# Patient Record
Sex: FEMALE | Race: OTHER | Employment: UNEMPLOYED | URBAN - METROPOLITAN AREA
[De-identification: names, ages, dates, MRNs, and addresses within clinical notes are randomized per-mention and may not be internally consistent; named-entity substitution may affect disease eponyms.]

---

## 2020-11-23 PROBLEM — K62.5 RECTAL BLEEDING: Status: ACTIVE | Noted: 2020-10-30

## 2021-10-07 ENCOUNTER — APPOINTMENT (OUTPATIENT)
Dept: GENERAL RADIOLOGY | Age: 61
End: 2021-10-07
Attending: NURSE PRACTITIONER
Payer: OTHER GOVERNMENT

## 2021-10-07 ENCOUNTER — HOSPITAL ENCOUNTER (EMERGENCY)
Age: 61
Discharge: HOME | End: 2021-10-07
Attending: EMERGENCY MEDICINE
Payer: OTHER GOVERNMENT

## 2021-10-07 VITALS
WEIGHT: 170 LBS | DIASTOLIC BLOOD PRESSURE: 73 MMHG | RESPIRATION RATE: 20 BRPM | SYSTOLIC BLOOD PRESSURE: 125 MMHG | TEMPERATURE: 97.9 F | HEART RATE: 72 BPM | OXYGEN SATURATION: 100 % | BODY MASS INDEX: 29.02 KG/M2 | HEIGHT: 64 IN

## 2021-10-07 DIAGNOSIS — R07.9 CHEST PAIN, UNSPECIFIED TYPE: Primary | ICD-10-CM

## 2021-10-07 LAB
ALBUMIN SERPL-MCNC: 4.1 G/DL (ref 3.4–5)
ALBUMIN/GLOB SERPL: 1.2 {RATIO} (ref 0.8–1.7)
ALP SERPL-CCNC: 74 U/L (ref 45–117)
ALT SERPL-CCNC: 37 U/L (ref 13–56)
ANION GAP SERPL CALC-SCNC: 6 MMOL/L (ref 3–18)
AST SERPL-CCNC: 27 U/L (ref 10–38)
ATRIAL RATE: 71 BPM
BASOPHILS # BLD: 0 K/UL (ref 0–0.1)
BASOPHILS NFR BLD: 1 % (ref 0–2)
BILIRUB SERPL-MCNC: 0.6 MG/DL (ref 0.2–1)
BUN SERPL-MCNC: 13 MG/DL (ref 7–18)
BUN/CREAT SERPL: 18 (ref 12–20)
CALCIUM SERPL-MCNC: 9.5 MG/DL (ref 8.5–10.1)
CALCULATED P AXIS, ECG09: 52 DEGREES
CALCULATED R AXIS, ECG10: 14 DEGREES
CALCULATED T AXIS, ECG11: 44 DEGREES
CHLORIDE SERPL-SCNC: 108 MMOL/L (ref 100–111)
CK MB CFR SERPL CALC: NORMAL % (ref 0–4)
CK MB SERPL-MCNC: <1 NG/ML (ref 5–25)
CK SERPL-CCNC: 150 U/L (ref 26–192)
CO2 SERPL-SCNC: 28 MMOL/L (ref 21–32)
CREAT SERPL-MCNC: 0.74 MG/DL (ref 0.6–1.3)
DIAGNOSIS, 93000: NORMAL
DIFFERENTIAL METHOD BLD: NORMAL
EOSINOPHIL # BLD: 0.1 K/UL (ref 0–0.4)
EOSINOPHIL NFR BLD: 2 % (ref 0–5)
ERYTHROCYTE [DISTWIDTH] IN BLOOD BY AUTOMATED COUNT: 12.6 % (ref 11.6–14.5)
GLOBULIN SER CALC-MCNC: 3.3 G/DL (ref 2–4)
GLUCOSE SERPL-MCNC: 110 MG/DL (ref 74–99)
HCT VFR BLD AUTO: 41 % (ref 35–45)
HGB BLD-MCNC: 13.3 G/DL (ref 12–16)
LIPASE SERPL-CCNC: 153 U/L (ref 73–393)
LYMPHOCYTES # BLD: 1.8 K/UL (ref 0.9–3.6)
LYMPHOCYTES NFR BLD: 39 % (ref 21–52)
MCH RBC QN AUTO: 28.4 PG (ref 24–34)
MCHC RBC AUTO-ENTMCNC: 32.4 G/DL (ref 31–37)
MCV RBC AUTO: 87.4 FL (ref 78–100)
MONOCYTES # BLD: 0.3 K/UL (ref 0.05–1.2)
MONOCYTES NFR BLD: 6 % (ref 3–10)
NEUTS SEG # BLD: 2.4 K/UL (ref 1.8–8)
NEUTS SEG NFR BLD: 52 % (ref 40–73)
P-R INTERVAL, ECG05: 168 MS
PLATELET # BLD AUTO: 251 K/UL (ref 135–420)
PMV BLD AUTO: 10 FL (ref 9.2–11.8)
POTASSIUM SERPL-SCNC: 4.3 MMOL/L (ref 3.5–5.5)
PROT SERPL-MCNC: 7.4 G/DL (ref 6.4–8.2)
Q-T INTERVAL, ECG07: 392 MS
QRS DURATION, ECG06: 78 MS
QTC CALCULATION (BEZET), ECG08: 425 MS
RBC # BLD AUTO: 4.69 M/UL (ref 4.2–5.3)
SODIUM SERPL-SCNC: 142 MMOL/L (ref 136–145)
TROPONIN I SERPL-MCNC: <0.02 NG/ML (ref 0–0.04)
VENTRICULAR RATE, ECG03: 71 BPM
WBC # BLD AUTO: 4.6 K/UL (ref 4.6–13.2)

## 2021-10-07 PROCEDURE — 83690 ASSAY OF LIPASE: CPT

## 2021-10-07 PROCEDURE — 71046 X-RAY EXAM CHEST 2 VIEWS: CPT

## 2021-10-07 PROCEDURE — 85025 COMPLETE CBC W/AUTO DIFF WBC: CPT

## 2021-10-07 PROCEDURE — 82553 CREATINE MB FRACTION: CPT

## 2021-10-07 PROCEDURE — 93005 ELECTROCARDIOGRAM TRACING: CPT

## 2021-10-07 PROCEDURE — 99283 EMERGENCY DEPT VISIT LOW MDM: CPT

## 2021-10-07 PROCEDURE — 80053 COMPREHEN METABOLIC PANEL: CPT

## 2021-10-07 NOTE — ED NOTES
I performed a brief evaluation, including history and physical, of the patient here in triage and I have determined that the patient will need further treatment and evaluation from the main side ER provider. I have placed initial orders to help in expediting patients care.      October 07, 2021 at 11:49 AM - SHERI SmithP        Visit Vitals  /73 (BP 1 Location: Left upper arm, BP Patient Position: At rest)   Pulse 72   Temp 97.9 °F (36.6 °C)   Resp 20   SpO2 100%

## 2021-10-07 NOTE — ED PROVIDER NOTES
EMERGENCY DEPARTMENT HISTORY AND PHYSICAL EXAM    12:54 PM patient seen at this time and results waiting      Date: 10/7/2021  Patient Name: Farshad Flores    History of Presenting Illness     Chief Complaint   Patient presents with    Chest Pain         History Provided By: patient    Additional History (Context): Farshad Flores is a 57 y.o. female presents with *yesterday began with right-sided chest pain, some right upper quadrant pain and severe nausea. It was so severe she had to sit up at night and \"just prayed. \". Pain largely resolved this morning just some vague right-sided chest discomfort not really pain at this point but was still quite scared by the incident. History of diabetes hypercholesterolemia cholecystectomy and recurrent pancreatitis. PCP: Ruby Campos MD    Chief Complaint:   Duration:    Timing:    Location:   Quality:   Severity:   Modifying Factors:   Associated Symptoms:       Current Outpatient Medications   Medication Sig Dispense Refill    naproxen (NAPROSYN) 500 mg tablet naproxen 500 mg tablet      triamcinolone acetonide (KENALOG) 0.1 % topical cream triamcinolone acetonide 0.1 % topical cream      trospium (SANCTURA) 20 mg tablet Take 1 Tab by mouth two (2) times a day. 180 Tab 3    estradioL (ESTRACE) 0.01 % (0.1 mg/gram) vaginal cream INSERT 1 GRAM BY VAGINAL ROUTE 3 TIMES PER WEEK FOR 90 DAYS      ciprofloxacin HCl (CIPRO PO) Take  by mouth.  garlic cap Take 1 Cap by mouth daily.  CAPSICUM, CAYENNE, PO Take 1 Cap by mouth daily.  GINGER OIL PO Take 1 Tab by mouth.  OTHER Take 1 Tab by mouth daily as needed. Indications: sea buck thorn, aloe vera, lipid acid, licorice, bitter melon, dandelion      APPLE CIDER VINEGAR PO Take 1 Tab by mouth daily as needed.          Past History     Past Medical History:  Past Medical History:   Diagnosis Date    Diabetes ([facility] [address]     Hyperlipidemia     Pancreatitis due to common bile duct stone        Past Surgical History:  Past Surgical History:   Procedure Laterality Date    HX  SECTION      HX CHOLECYSTECTOMY  2009    HX OVARIAN CYST REMOVAL         Family History:  Family History   Problem Relation Age of Onset    Diabetes Mother     Hypertension Father     Kidney Disease Father        Social History:  Social History     Tobacco Use    Smoking status: Never Smoker    Smokeless tobacco: Never Used   Substance Use Topics    Alcohol use: No    Drug use: No       Allergies: Allergies   Allergen Reactions    Neomycin-Polymyxin-Hc Swelling         Review of Systems     Review of Systems   Constitutional: Negative for diaphoresis and fever. HENT: Negative for congestion and sore throat. Eyes: Negative for pain and itching. Respiratory: Negative for cough and shortness of breath. Cardiovascular: Positive for chest pain. Negative for palpitations. Gastrointestinal: Positive for abdominal pain and nausea. Negative for diarrhea. Endocrine: Negative for polydipsia and polyuria. Genitourinary: Negative for dysuria and hematuria. Musculoskeletal: Negative for arthralgias and myalgias. Skin: Negative for rash and wound. Neurological: Negative for seizures and syncope. Hematological: Does not bruise/bleed easily. Psychiatric/Behavioral: Negative for agitation and hallucinations. Physical Exam       Patient Vitals for the past 12 hrs:   Temp Pulse Resp BP SpO2   10/07/21 1116 97.9 °F (36.6 °C) 72 20 125/73 100 %       IPVITALS  Patient Vitals for the past 24 hrs:   BP Temp Pulse Resp SpO2 Height Weight   10/07/21 1157      5' 4\" (1.626 m) 77.1 kg (170 lb)   10/07/21 1116 125/73 97.9 °F (36.6 °C) 72 20 100 %         Physical Exam  Vitals and nursing note reviewed. Constitutional:       General: She is not in acute distress. Appearance: She is well-developed. She is obese. She is not ill-appearing. HENT:      Head: Normocephalic and atraumatic.    Eyes: General: No scleral icterus. Conjunctiva/sclera: Conjunctivae normal.   Neck:      Vascular: No JVD. Cardiovascular:      Rate and Rhythm: Normal rate and regular rhythm. Heart sounds: Normal heart sounds. Comments: 4 intact extremity pulses  Pulmonary:      Effort: Pulmonary effort is normal.      Breath sounds: Normal breath sounds. Chest:      Chest wall: No tenderness. Abdominal:      Palpations: Abdomen is soft. There is no mass. Tenderness: There is no abdominal tenderness. Musculoskeletal:         General: Normal range of motion. Cervical back: Normal range of motion and neck supple. Lymphadenopathy:      Cervical: No cervical adenopathy. Skin:     General: Skin is warm and dry. Neurological:      Mental Status: She is alert. Diagnostic Study Results   Labs -  Recent Results (from the past 12 hour(s))   CBC WITH AUTOMATED DIFF    Collection Time: 10/07/21 11:52 AM   Result Value Ref Range    WBC 4.6 4.6 - 13.2 K/uL    RBC 4.69 4.20 - 5.30 M/uL    HGB 13.3 12.0 - 16.0 g/dL    HCT 41.0 35.0 - 45.0 %    MCV 87.4 78.0 - 100.0 FL    MCH 28.4 24.0 - 34.0 PG    MCHC 32.4 31.0 - 37.0 g/dL    RDW 12.6 11.6 - 14.5 %    PLATELET 769 092 - 120 K/uL    MPV 10.0 9.2 - 11.8 FL    NEUTROPHILS 52 40 - 73 %    LYMPHOCYTES 39 21 - 52 %    MONOCYTES 6 3 - 10 %    EOSINOPHILS 2 0 - 5 %    BASOPHILS 1 0 - 2 %    ABS. NEUTROPHILS 2.4 1.8 - 8.0 K/UL    ABS. LYMPHOCYTES 1.8 0.9 - 3.6 K/UL    ABS. MONOCYTES 0.3 0.05 - 1.2 K/UL    ABS. EOSINOPHILS 0.1 0.0 - 0.4 K/UL    ABS.  BASOPHILS 0.0 0.0 - 0.1 K/UL    DF AUTOMATED     METABOLIC PANEL, COMPREHENSIVE    Collection Time: 10/07/21 11:52 AM   Result Value Ref Range    Sodium 142 136 - 145 mmol/L    Potassium 4.3 3.5 - 5.5 mmol/L    Chloride 108 100 - 111 mmol/L    CO2 28 21 - 32 mmol/L    Anion gap 6 3.0 - 18 mmol/L    Glucose 110 (H) 74 - 99 mg/dL    BUN 13 7.0 - 18 MG/DL    Creatinine 0.74 0.6 - 1.3 MG/DL    BUN/Creatinine ratio 18 12 - 20      GFR est AA >60 >60 ml/min/1.73m2    GFR est non-AA >60 >60 ml/min/1.73m2    Calcium 9.5 8.5 - 10.1 MG/DL    Bilirubin, total 0.6 0.2 - 1.0 MG/DL    ALT (SGPT) 37 13 - 56 U/L    AST (SGOT) 27 10 - 38 U/L    Alk. phosphatase 74 45 - 117 U/L    Protein, total 7.4 6.4 - 8.2 g/dL    Albumin 4.1 3.4 - 5.0 g/dL    Globulin 3.3 2.0 - 4.0 g/dL    A-G Ratio 1.2 0.8 - 1.7     CARDIAC PANEL,(CK, CKMB & TROPONIN)    Collection Time: 10/07/21 11:52 AM   Result Value Ref Range    CK - MB <1.0 <3.6 ng/ml    CK-MB Index  0.0 - 4.0 %     CALCULATION NOT PERFORMED WHEN RESULT IS BELOW LINEAR LIMIT     26 - 192 U/L    Troponin-I, QT <0.02 0.0 - 0.045 NG/ML   LIPASE    Collection Time: 10/07/21 11:52 AM   Result Value Ref Range    Lipase 153 73 - 393 U/L       Radiologic Studies -   XR CHEST PA LAT   Final Result   No acute findings. XR CHEST PA LAT    Result Date: 10/7/2021  EXAM: XR CHEST PA LAT INDICATION: CP COMPARISON: None. FINDINGS: PA and lateral radiographs of the chest demonstrate clear lungs. The cardiac and mediastinal contours and pulmonary vascularity are normal. Trace scoliosis. Cholecystectomy. .     No acute findings. Medications ordered:   Medications - No data to display      Medical Decision Making   Initial Medical Decision Making and DDx:  Chest x-ray no acute process    Twelve-lead EKG sinus rhythm at 71 no acute ischemic disease    Consider ACS has risk factors of age high cholesterol and diabetes. Unlikely to be a severe episode of pancreatitis as her pain has resolved  Considered pneumonia pneumothorax    ED Course: Progress Notes, Reevaluation, and Consults:     2:36 PM Pt reevaluated at this time. Discussed results and findings, as well as, diagnosis and plan for discharge. Follow up with doctors/services listed. Return to the emergency department for alarming symptoms. Pt verbalizes understanding and agreement with plan. All questions addressed.     No alarming findings no signs of pancreatitis pneumothorax pneumonia ACS. Patient still pain-free. Suitable for discharge. I am the first provider for this patient. I reviewed the vital signs, available nursing notes, past medical history, past surgical history, family history and social history. Patient Vitals for the past 12 hrs:   Temp Pulse Resp BP SpO2   10/07/21 1116 97.9 °F (36.6 °C) 72 20 125/73 100 %       Vital Signs-Reviewed the patient's vital signs. Pulse Oximetry Analysis, Cardiac Monitor, 12 lead ekg:      Interpreted by the EP. Records Reviewed: Nursing notes reviewed (Time of Review: 12:54 PM)    Procedures:   Critical Care Time:   Aspirin: (was aspirin given for stroke?)    Diagnosis     Clinical Impression:   1. Chest pain, unspecified type        Disposition: Discharged      Follow-up Information     Follow up With Specialties Details Why Contact Info    [address] [facility]    [address]  [phone]           Patient's Medications   Start Taking    No medications on file   Continue Taking    APPLE CIDER VINEGAR PO    Take 1 Tab by mouth daily as needed. CAPSICUM, CAYENNE, PO    Take 1 Cap by mouth daily. CIPROFLOXACIN HCL (CIPRO PO)    Take  by mouth. ESTRADIOL (ESTRACE) 0.01 % (0.1 MG/GRAM) VAGINAL CREAM    INSERT 1 GRAM BY VAGINAL ROUTE 3 TIMES PER WEEK FOR 90 DAYS    GARLIC CAP    Take 1 Cap by mouth daily. GINGER OIL PO    Take 1 Tab by mouth. NAPROXEN (NAPROSYN) 500 MG TABLET    naproxen 500 mg tablet    OTHER    Take 1 Tab by mouth daily as needed. Indications: sea buck thorn, aloe vera, lipid acid, licorice, bitter melon, dandelion    TRIAMCINOLONE ACETONIDE (KENALOG) 0.1 % TOPICAL CREAM    triamcinolone acetonide 0.1 % topical cream    TROSPIUM (SANCTURA) 20 MG TABLET    Take 1 Tab by mouth two (2) times a day.    These Medications have changed    No medications on file   Stop Taking    No medications on file _______________________________    Notes:    [facility], MD using Dragon dictation      _______________________________

## 2021-10-07 NOTE — MED STUDENT NOTES
EMERGENCY DEPARTMENT HISTORY AND PHYSICAL EXAM    1:23 PM    Date: 10/7/2021  Patient Name: Faviola Ramirez    History of Presenting Illness     Chief Complaint   Patient presents with    Chest Pain       History Provided By: Patient  Location/Duration/Severity/Modifying factors   Servando Chilel is a 62yo female with a history of DM, HLD, and pancreatitis with a CBD stone s/p cholecystectomy. The history is provided by the patient. Chest Pain (Angina)   This is a new problem. The current episode started 12 to 24 hours ago. The problem has been resolved. The problem occurs constantly. The pain is present in the right side. The pain is at a severity of 7/10. The pain is moderate. Quality: pulling sensation. The pain radiates to the mid back. Exacerbated by: laying on her back. Pertinent negatives include no abdominal pain, no cough, no dizziness, no fever, no nausea, no shortness of breath and no vomiting. She has tried nothing (Relieved by leaning forward) for the symptoms. Risk factors include diabetes mellitus and dyslipidemia. Her past medical history is significant for DM. PCP: Ruby Campos MD    Current Outpatient Medications   Medication Sig Dispense Refill    naproxen (NAPROSYN) 500 mg tablet naproxen 500 mg tablet      triamcinolone acetonide (KENALOG) 0.1 % topical cream triamcinolone acetonide 0.1 % topical cream      trospium (SANCTURA) 20 mg tablet Take 1 Tab by mouth two (2) times a day. 180 Tab 3    estradioL (ESTRACE) 0.01 % (0.1 mg/gram) vaginal cream INSERT 1 GRAM BY VAGINAL ROUTE 3 TIMES PER WEEK FOR 90 DAYS      ciprofloxacin HCl (CIPRO PO) Take  by mouth.  garlic cap Take 1 Cap by mouth daily.  CAPSICUM, CAYENNE, PO Take 1 Cap by mouth daily.  GINGER OIL PO Take 1 Tab by mouth.  OTHER Take 1 Tab by mouth daily as needed.  Indications: sea buck thorn, aloe vera, lipid acid, licorice, bitter melon, dandelion      APPLE CIDER VINEGAR PO Take 1 Tab by mouth daily as needed. Past History     Past Medical History:  Past Medical History:   Diagnosis Date    Diabetes ([address]     Hyperlipidemia     Pancreatitis due to common bile duct stone        Past Surgical History:  Past Surgical History:   Procedure Laterality Date    HX  SECTION      HX CHOLECYSTECTOMY  2009    HX OVARIAN CYST REMOVAL         Family History:  Family History   Problem Relation Age of Onset    Diabetes Mother     Hypertension Father     Kidney Disease Father        Social History:  Social History     Tobacco Use    Smoking status: Never Smoker    Smokeless tobacco: Never Used   Substance Use Topics    Alcohol use: No    Drug use: No       Allergies: Allergies   Allergen Reactions    Neomycin-Polymyxin-Hc Swelling       I reviewed and confirmed the above information with patient and updated as necessary. Review of Systems     Review of Systems   Constitutional: Negative for appetite change, chills and fever. HENT: Negative for congestion and sneezing. Respiratory: Negative for cough, shortness of breath and wheezing. Cardiovascular: Positive for chest pain. Negative for leg swelling. Gastrointestinal: Negative for abdominal pain, constipation, diarrhea, nausea and vomiting. Skin: Negative for color change and pallor. Neurological: Negative for dizziness, tremors, facial asymmetry, speech difficulty and light-headedness. Psychiatric/Behavioral: Negative for confusion, decreased concentration and hallucinations. Physical Exam     Visit Vitals  /73 (BP 1 Location: Left upper arm, BP Patient Position: At rest)   Pulse 72   Temp 97.9 °F (36.6 °C)   Resp 20   Ht 5' 4\" (1.626 m)   Wt 77.1 kg (170 lb)   SpO2 100%   BMI 29.18 kg/m²       Physical Exam  Constitutional:       General: She is not in acute distress. Appearance: She is not ill-appearing. HENT:      Head: Atraumatic. Eyes:      Extraocular Movements: Extraocular movements intact. Cardiovascular:      Rate and Rhythm: Normal rate and regular rhythm. Heart sounds: Normal heart sounds. Heart sounds not distant. No friction rub. Pulmonary:      Effort: No accessory muscle usage or respiratory distress. Breath sounds: Normal breath sounds. Chest:      Chest wall: No mass, deformity or tenderness. Abdominal:      General: Bowel sounds are normal.      Palpations: Abdomen is soft. Tenderness: There is no abdominal tenderness. Musculoskeletal:         General: Normal range of motion. Cervical back: Neck supple. Skin:     General: Skin is warm and dry. Neurological:      General: No focal deficit present. Mental Status: She is alert and oriented to person, place, and time. Motor: No weakness. Diagnostic Study Results     Labs -  Recent Results (from the past 24 hour(s))   CBC WITH AUTOMATED DIFF    Collection Time: 10/07/21 11:52 AM   Result Value Ref Range    WBC 4.6 4.6 - 13.2 K/uL    RBC 4.69 4.20 - 5.30 M/uL    HGB 13.3 12.0 - 16.0 g/dL    HCT 41.0 35.0 - 45.0 %    MCV 87.4 78.0 - 100.0 FL    MCH 28.4 24.0 - 34.0 PG    MCHC 32.4 31.0 - 37.0 g/dL    RDW 12.6 11.6 - 14.5 %    PLATELET 374 597 - 190 K/uL    MPV 10.0 9.2 - 11.8 FL    NEUTROPHILS 52 40 - 73 %    LYMPHOCYTES 39 21 - 52 %    MONOCYTES 6 3 - 10 %    EOSINOPHILS 2 0 - 5 %    BASOPHILS 1 0 - 2 %    ABS. NEUTROPHILS 2.4 1.8 - 8.0 K/UL    ABS. LYMPHOCYTES 1.8 0.9 - 3.6 K/UL    ABS. MONOCYTES 0.3 0.05 - 1.2 K/UL    ABS. EOSINOPHILS 0.1 0.0 - 0.4 K/UL    ABS.  BASOPHILS 0.0 0.0 - 0.1 K/UL    DF AUTOMATED     METABOLIC PANEL, COMPREHENSIVE    Collection Time: 10/07/21 11:52 AM   Result Value Ref Range    Sodium 142 136 - 145 mmol/L    Potassium 4.3 3.5 - 5.5 mmol/L    Chloride 108 100 - 111 mmol/L    CO2 28 21 - 32 mmol/L    Anion gap 6 3.0 - 18 mmol/L    Glucose 110 (H) 74 - 99 mg/dL    BUN 13 7.0 - 18 MG/DL    Creatinine 0.74 0.6 - 1.3 MG/DL    BUN/Creatinine ratio 18 12 - 20      GFR est AA >60 >60 ml/min/1.73m2    GFR est non-AA >60 >60 ml/min/1.73m2    Calcium 9.5 8.5 - 10.1 MG/DL    Bilirubin, total 0.6 0.2 - 1.0 MG/DL    ALT (SGPT) 37 13 - 56 U/L    AST (SGOT) 27 10 - 38 U/L    Alk. phosphatase 74 45 - 117 U/L    Protein, total 7.4 6.4 - 8.2 g/dL    Albumin 4.1 3.4 - 5.0 g/dL    Globulin 3.3 2.0 - 4.0 g/dL    A-G Ratio 1.2 0.8 - 1.7     CARDIAC PANEL,(CK, CKMB & TROPONIN)    Collection Time: 10/07/21 11:52 AM   Result Value Ref Range    CK - MB <1.0 <3.6 ng/ml    CK-MB Index  0.0 - 4.0 %     CALCULATION NOT PERFORMED WHEN RESULT IS BELOW LINEAR LIMIT     26 - 192 U/L    Troponin-I, QT <0.02 0.0 - 0.045 NG/ML   LIPASE    Collection Time: 10/07/21 11:52 AM   Result Value Ref Range    Lipase 153 73 - 393 U/L         Radiologic Studies -   XR CHEST PA LAT   Final Result   No acute findings. Medical Decision Making   I am the first provider for this patient. I reviewed the vital signs, available nursing notes, past medical history, past surgical history, family history and social history. Vital Signs-Reviewed the patient's vital signs. EKG: NSR, possible left atrial enlargement, cannot exclude anterior MI versus lead placement issue. Records Reviewed:  Old Medical Records (Time of Review: 1:23 PM)    Provider Notes (Medical Decision Making):   MDM  Number of Diagnoses or Management Options  Chest pain, unspecified type: established, improving     Amount and/or Complexity of Data Reviewed  Clinical lab tests: reviewed and ordered  Tests in the medicine section of CPT®: ordered and reviewed  Discussion of test results with the performing providers: yes  Decide to obtain previous medical records or to obtain history from someone other than the patient: no  Review and summarize past medical records: yes  Independent visualization of images, tracings, or specimens: yes    Risk of Complications, Morbidity, and/or Mortality  Presenting problems: low  Diagnostic procedures: low  Management options: minimal    Critical Care  Total time providing critical care:  minutes    Patient Progress  Patient progress: resolved        ED Course: Progress Notes, Reevaluation, and Consults:         Procedures    Critical Care Time:     Diagnosis     Clinical Impression: Chest pain may be associated with GERD  Disposition: Patient can be discharged. Patient's labs, imaging, and clinical presentation are not concerning for acute cardiopulmonary. Patient likely experienced an episode of GERD and can continue home Protonix for management. Follow-up Information    None          Patient's Medications   Start Taking    No medications on file   Continue Taking    APPLE CIDER VINEGAR PO    Take 1 Tab by mouth daily as needed. CAPSICUM, CAYENNE, PO    Take 1 Cap by mouth daily. CIPROFLOXACIN HCL (CIPRO PO)    Take  by mouth. ESTRADIOL (ESTRACE) 0.01 % (0.1 MG/GRAM) VAGINAL CREAM    INSERT 1 GRAM BY VAGINAL ROUTE 3 TIMES PER WEEK FOR 90 DAYS    GARLIC CAP    Take 1 Cap by mouth daily. GINGER OIL PO    Take 1 Tab by mouth. NAPROXEN (NAPROSYN) 500 MG TABLET    naproxen 500 mg tablet    OTHER    Take 1 Tab by mouth daily as needed. Indications: sea buck thorn, aloe vera, lipid acid, licorice, bitter melon, dandelion    TRIAMCINOLONE ACETONIDE (KENALOG) 0.1 % TOPICAL CREAM    triamcinolone acetonide 0.1 % topical cream    TROSPIUM (SANCTURA) 20 MG TABLET    Take 1 Tab by mouth two (2) times a day. These Medications have changed    No medications on file   Stop Taking    No medications on file       [address] [address]   October 7, 2021, 1:23 PM     This note is dictated utilizing Dragon voice recognition software. Unfortunately this leads to occasional typographical errors using the voice recognition. I apologize in advance if the situation occurs. If questions occur please do not hesitate to contact me directly.     Casper Nash  *ATTENTION:  This note has been created by a medical student for educational purposes only. Please do not refer to the content of this note for clinical decision-making, billing, or other purposes. Please see attending physicians note to obtain clinical information on this patient. *

## 2021-10-07 NOTE — ED TRIAGE NOTES
marshall reports having r. Side chest pain, lower back, and upper mid adbominal pain since last pm. Nauses, denies vomitting. Pain 5/10.